# Patient Record
(demographics unavailable — no encounter records)

---

## 2024-10-23 NOTE — HISTORY OF PRESENT ILLNESS
[de-identified] : eczema [FreeTextEntry6] : - p/w 2 weeks of prolonged perioral lesions: initiating two small spots around mouth, developed more lesions lower side, the largest lesion had bleeding and now healing, however, still discolored and not completely cleared - she often licks the lesion  - has been on hydrocortisone 2.5% BID - reported that she has sensitive skin as baseline - denies fever, ear pain, headahce

## 2024-10-23 NOTE — REVIEW OF SYSTEMS
[Nasal Congestion] : nasal congestion [Negative] : Genitourinary [Rash] : rash [Dry Skin] : dry skin

## 2024-10-23 NOTE — DISCUSSION/SUMMARY
[FreeTextEntry1] : # evolving AOM - would treat as mild viral ear infection for now, with antipyretic BID-TID for 3 days, as she has been free from fever/pain/discomfort  - RTC if no improvement; may consider antibiotic course # URI, PND - supportive care: hydration, clearing nose, positioning, etc. # perioral dermatitis - possibly started with irritation then prolonged with frequent licking and/or URI - supportive care: oral hygiene, A and D ointment/vaseline application, recommended to step-down hydrocortisone application - will follow

## 2024-10-23 NOTE — PHYSICAL EXAM
[Clear] : right tympanic membrane clear [Erythema] : erythema [Purulent Effusion] : purulent effusion [NL] : moves all extremities x4, warm, well perfused x4 [Dry] : dry [Hyperpigmented] : hyperpigmented [de-identified] : PND++ [de-identified] : darkened red discoloration around right lower part of her mouth

## 2024-12-18 NOTE — HISTORY OF PRESENT ILLNESS
[de-identified] : cut herself [FreeTextEntry6] : - found to have a pruritic wound in her genital area an hour ago - mother recalls something was bothering her yesterday - denies pain, dysuria, fever, discharge

## 2024-12-18 NOTE — DISCUSSION/SUMMARY
[FreeTextEntry1] : # dermatitis - genital hygiene, A and D ointment, and hydrocortisone 1% once a day - RTC if no improvement

## 2024-12-18 NOTE — PHYSICAL EXAM
[NL] : grossly EOMI [Normal External Genitalia] : normal external genitalia [Labial Adhesions] : no labial adhesions [Erythematous Labia Majora] : erythematous labia majora [Vaginal Discharge] : no vaginal discharge [FreeTextEntry6] : erythema, a/w partially peeled off skin, in her genital area, on the right sided skin crease of labia majora [de-identified] : perioral dermatitis

## 2025-01-17 NOTE — PHYSICAL EXAM
[Enlarged Tonsils] : enlarged tonsils [NL] : moves all extremities x4, warm, well perfused x4 [Capillary Refill <2s] : capillary refill < 2s [de-identified] : dark discoloration under lower lip

## 2025-01-17 NOTE — HISTORY OF PRESENT ILLNESS
[de-identified] : fever, sibling has flu [FreeTextEntry6] : - brother positive for flu 2 days prior - she developed fever yesterday, 38.2C, a/w cough - otherwise well

## 2025-01-17 NOTE — DISCUSSION/SUMMARY
[FreeTextEntry1] : # fever after exposure to flu, Flu A - placed on oseltamivir for 5 days - 4 plex to r/o co-infection for their anticipated trip - supportive care advised: humidifier, hydration, honey if 1 year and older, clearing nose, positioning. etc.  - RTC for persistent high fever (longer than 5 days), breathing difficulty, poor oral intake, decreased UOP, or any new concerns/symptoms # perioral dermatitis - supportive care: try not to lick, ointment application

## 2025-05-08 NOTE — DISCUSSION/SUMMARY
[Continue Regimen] : feeding [Anticipatory Guidance Given] : Anticipatory guidance addressed as per the history of present illness section [No Vaccines] : no vaccines needed [Normal Growth] : growth [Normal Development] : development [None] : No known medical problems [No Elimination Concerns] : elimination [No Feeding Concerns] : feeding [No Skin Concerns] : skin [Normal Sleep Pattern] : sleep [School Readiness] : school readiness [Mental Health] : mental health [Nutrition and Physical Activity] : nutrition and physical activity [Oral Health] : oral health [Safety] : safety [No Medications] : ~He/She~ is not on any medications [Patient] : patient [Full Activity without restrictions including Physical Education & Athletics] : Full Activity without restrictions including Physical Education & Athletics [I have examined the above-named student and completed the preparticipation physical evaluation. The athlete does not present apparent clinical contraindications to practice and participate in sport(s) as outlined above. A copy of the physical exam is on r] : I have examined the above-named student and completed the preparticipation physical evaluation. The athlete does not present apparent clinical contraindications to practice and participate in sport(s) as outlined above. A copy of the physical exam is on record in my office and can be made available to the school at the request of the parents. If conditions arise after the athlete has been cleared for participation, the physician may rescind the clearance until the problem is resolved and the potential consequences are completely explained to the athlete (and parents/guardians). [FreeTextEntry1] : 6yr old well child, growth/development appropriate IUTD skin checks with derm going forward, referral given, consistent SPF use/wear lead assessment negative

## 2025-05-08 NOTE — HISTORY OF PRESENT ILLNESS
[Father] : father [Normal] : Normal [Brushing teeth] : Brushing teeth [Yes] : Patient goes to dentist yearly [Toothpaste] : Primary Fluoride Source: Toothpaste [Playtime (60 min/d)] : Playtime 60 min a day [Appropiate parent-child-sibling interaction] : Appropriate parent-child-sibling interaction [Child Cooperates] : Child cooperates [Parent has appropriate responses to behavior] : Parent has appropriate responses to behavior [Grade ___] : Grade [unfilled] [No difficulties with Homework] : No difficulties with homework [Adequate performance] : Adequate performance [Adequate attention] : Adequate attention [Up to date] : Up to date [NO] : No [FreeTextEntry7] : 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